# Patient Record
Sex: FEMALE | Race: WHITE | NOT HISPANIC OR LATINO | Employment: OTHER | URBAN - METROPOLITAN AREA
[De-identification: names, ages, dates, MRNs, and addresses within clinical notes are randomized per-mention and may not be internally consistent; named-entity substitution may affect disease eponyms.]

---

## 2017-09-27 ENCOUNTER — HOSPITAL ENCOUNTER (EMERGENCY)
Facility: HOSPITAL | Age: 52
Discharge: HOME | End: 2017-09-27
Attending: EMERGENCY MEDICINE
Payer: MEDICAID

## 2017-09-27 VITALS
OXYGEN SATURATION: 96 % | WEIGHT: 170 LBS | DIASTOLIC BLOOD PRESSURE: 85 MMHG | SYSTOLIC BLOOD PRESSURE: 132 MMHG | HEART RATE: 104 BPM | HEIGHT: 69 IN | TEMPERATURE: 98 F | RESPIRATION RATE: 18 BRPM | BODY MASS INDEX: 25.18 KG/M2

## 2017-09-27 DIAGNOSIS — M25.569 KNEE PAIN: ICD-10-CM

## 2017-09-27 DIAGNOSIS — S83.92XA SPRAIN OF LEFT KNEE, UNSPECIFIED LIGAMENT, INITIAL ENCOUNTER: Primary | ICD-10-CM

## 2017-09-27 PROCEDURE — 99283 EMERGENCY DEPT VISIT LOW MDM: CPT

## 2017-09-27 RX ORDER — NAPROXEN 375 MG/1
375 TABLET ORAL 2 TIMES DAILY WITH MEALS
Qty: 30 TABLET | Refills: 0 | Status: SHIPPED | OUTPATIENT
Start: 2017-09-27

## 2017-09-27 NOTE — ED NOTES
Pt examined by Dr Coppola  without RN, educated on prescriptions, given discharge instructions and discharged to [facility]. See provider notes for exam.

## 2017-09-27 NOTE — ED PROVIDER NOTES
"SCRIBE #1 NOTE: I, Atilio Murphy Magdaleno, am scribing for, and in the presence of, Ruslan Coppola MD. I have scribed the entire note.      History      Chief Complaint   Patient presents with    Knee Pain     L knee pain x 3 weeks after cleaning a tub on her knees       Review of patient's allergies indicates:  No Known Allergies     HPI   HPI    9/27/2017, 3:01 PM   History obtained from the patient      History of Present Illness: Elham Leiva is a 52 y.o. female patient who presents to the Emergency Department for left knee pain which onset gradually 3 weeks ago after pt was cleaning a tub and heard a "pop" in her knee. Sxs are constant and moderate in severity. There are no mitigating or exacerbating factors noted. Associated sxs include left knee swelling.  Pt denies any fever, N/V/D, numbness, tingling, calf pain, ankle pain, trouble walking, and all other sxs at this time. No further complaints or concerns at this time.     Arrival mode: Personal vehicle      PCP: Provider [facility]     Past Medical History:  Past medical history reviewed not relevant      Past Surgical History:  Past surgical history reviewed not relevant      Family History:  Family history reviewed not relevant      Social History:  Social History    Social History Main Topics    Social History Main Topics    Smoking status: Unknown if ever smoked    Smokeless tobacco: Unknown if ever used    Alcohol Use: Unknown drinking history    Drug Use: Unknown if ever used    Sexual Activity: Unknown         ROS   Review of Systems   Constitutional: Negative for fever.   HENT: Negative for sore throat.    Respiratory: Negative for shortness of breath.    Cardiovascular: Negative for chest pain.   Gastrointestinal: Negative for abdominal pain, diarrhea, nausea and vomiting.   Genitourinary: Negative for dysuria.   Musculoskeletal: Positive for arthralgias (left knee) and joint swelling (left knee). Negative for back pain.   Skin: " "Negative for rash.   Neurological: Negative for weakness.   Hematological: Does not bruise/bleed easily.       Physical Exam      Initial Vitals [09/27/17 1453]   BP Pulse Resp Temp SpO2   132/85 104 18 98 °F (36.7 °C) 96 %      MAP       100.67          Physical Exam  Nursing Notes and Vital Signs Reviewed.  Constitutional: Patient is in no acute distress. Well-developed and well-nourished.  Head: Atraumatic. Normocephalic.  Eyes: PERRL. EOM intact. Conjunctivae are not pale. No scleral icterus.  ENT: Mucous membranes are moist. Oropharynx is clear and symmetric.    Neck: Supple. Full ROM. No lymphadenopathy.  Cardiovascular: Regular rate. Regular rhythm. No murmurs, rubs, or gallops. Distal pulses are 2+ and symmetric.  Pulmonary/Chest: No respiratory distress. Clear to auscultation bilaterally. No wheezing, rales, or rhonchi.  Abdominal: Soft and non-distended.  There is no tenderness.  No rebound, guarding, or rigidity. Good bowel sounds.  Genitourinary: No CVA tenderness  Musculoskeletal: Moves all extremities. No obvious deformities. No edema. No calf tenderness.  Left Knee:  No obvious deformity. There is no swelling.  There is no tenderness.  No increased warmth, erythema, induration or fluctuance.  No ligament laxity. DP and PT pulses are 2+.  Normal capillary refill.  Distal sensation is intact.  Skin: Warm and dry.  Neurological:  Alert, awake, and appropriate.  Normal speech.  No acute focal neurological deficits are appreciated.  Psychiatric: Normal affect. Good eye contact. Appropriate in content.    ED Course    Procedures  ED Vital Signs:  Vitals:    09/27/17 1453   BP: 132/85   Pulse: 104   Resp: 18   Temp: 98 °F (36.7 °C)   TempSrc: Oral   SpO2: 96%   Weight: 77.1 kg (170 lb)   Height: 5' 9" (1.753 m)       Imaging Results:  Imaging Results          X-Ray Knee Complete 4 or More Views Left (Final result)  Result time 09/27/17 15:28:46    Final result by Shabbir Roman MD (09/27/17 15:28:46)     "             Impression:      No acute fracture or dislocation. Small joint effusion. Mild degenerative changes      Electronically signed by: ROSELINE MOULTON MD  Date:     09/27/17  Time:    15:28              Narrative:    History:  Pain    Comparison:  None    Results:  4 views of the left knee were obtained.    No evidence of acute fracture or dislocation.  Bony mineralization is normal.  Soft tissues are unremarkable.  Mild tricompartment degenerative changes with mild narrowing and sclerosis. Small joint effusion on the lateral view.                                      The Emergency Provider reviewed the vital signs and test results, which are outlined above.    ED Discussion     3:59 PM: Reassessed pt. Discussed with pt all pertinent ED information and results. Discussed plan of treatment with pt. Gave pt all f/u and return to the ED instructions. All questions and concerns were addressed at this time. Pt understands and agrees to plan as discussed. Pt is stable for discharge.     I discussed with patient and/or family/caretaker that negative X-ray does not rule out occult fracture or other soft tissue injury.  Persistent pain greater than 7-10 days or increased pain requires follow up, specifically with orthopedics.     ED Medication(s):  Medications - No data to display    Discharge Medication List as of 9/27/2017  3:57 PM      START taking these medications    Details   naproxen (NAPROSYN) 375 MG tablet Take 1 tablet (375 mg total) by mouth 2 (two) times daily with meals., Starting Wed 9/27/2017, Print             Follow-up Information     [facility] in 2 days.    Contact information:  [address] [facility] [address] [phone]                     Medical Decision Making    Medical Decision Making:   Clinical Tests:   Radiological Study: Ordered and Reviewed           Scribe Attestation:   Scribe #1: I performed the above scribed service and the documentation accurately describes  the services I performed. I attest to the accuracy of the note.    Attending:   Physician Attestation Statement for Scribe #1: I, Ruslan Coppola MD, personally performed the services described in this documentation, as scribed by Atilio Magdaleno, in my presence, and it is both accurate and complete.          Clinical Impression       ICD-10-CM ICD-9-CM   1. Sprain of left knee, unspecified ligament, initial encounter S83.92XA 844.9   2. Knee pain M25.569 719.46       Disposition:   Disposition: Discharged  Condition: Stable         Ruslan Coppola MD  09/27/17 3011

## 2018-07-25 ENCOUNTER — HOSPITAL ENCOUNTER (OUTPATIENT)
Dept: RADIOLOGY | Facility: HOSPITAL | Age: 53
Discharge: HOME | End: 2018-07-25
Attending: NURSE PRACTITIONER
Payer: MEDICAID

## 2018-07-25 DIAGNOSIS — M25.562 LEFT KNEE PAIN: ICD-10-CM

## 2018-07-25 DIAGNOSIS — M25.562 LEFT KNEE PAIN: Primary | ICD-10-CM

## 2018-07-25 PROCEDURE — 73562 X-RAY EXAM OF KNEE 3: CPT | Mod: TC,PO,LT

## 2018-07-25 PROCEDURE — 73560 X-RAY EXAM OF KNEE 1 OR 2: CPT | Mod: TC,PO,RT

## 2018-07-25 PROCEDURE — 73560 X-RAY EXAM OF KNEE 1 OR 2: CPT | Mod: 26,XS,RT, | Performed by: RADIOLOGY

## 2018-07-25 PROCEDURE — 73562 X-RAY EXAM OF KNEE 3: CPT | Mod: 26,LT,, | Performed by: RADIOLOGY

## 2022-03-13 ENCOUNTER — HOSPITAL ENCOUNTER (EMERGENCY)
Facility: HOSPITAL | Age: 57
Discharge: HOME | End: 2022-03-13
Attending: EMERGENCY MEDICINE
Payer: MEDICAID

## 2022-03-13 VITALS
HEART RATE: 102 BPM | TEMPERATURE: 99 F | DIASTOLIC BLOOD PRESSURE: 88 MMHG | WEIGHT: 180 LBS | RESPIRATION RATE: 18 BRPM | HEIGHT: 69 IN | BODY MASS INDEX: 26.66 KG/M2 | OXYGEN SATURATION: 96 % | SYSTOLIC BLOOD PRESSURE: 147 MMHG

## 2022-03-13 DIAGNOSIS — S82.831A OTHER CLOSED FRACTURE OF DISTAL END OF RIGHT FIBULA, INITIAL ENCOUNTER: Primary | ICD-10-CM

## 2022-03-13 DIAGNOSIS — M25.571 RIGHT ANKLE PAIN: ICD-10-CM

## 2022-03-13 PROCEDURE — 29515 APPLICATION SHORT LEG SPLINT: CPT | Mod: RT

## 2022-03-13 PROCEDURE — 25000003 PHARM REV CODE 250: Performed by: NURSE PRACTITIONER

## 2022-03-13 PROCEDURE — 99283 EMERGENCY DEPT VISIT LOW MDM: CPT | Mod: 25

## 2022-03-13 RX ORDER — HYDROCODONE BITARTRATE AND ACETAMINOPHEN 10; 325 MG/1; MG/1
1 TABLET ORAL EVERY 4 HOURS PRN
Qty: 18 TABLET | Refills: 0 | Status: SHIPPED | OUTPATIENT
Start: 2022-03-13

## 2022-03-13 RX ORDER — HYDROCODONE BITARTRATE AND ACETAMINOPHEN 5; 325 MG/1; MG/1
1 TABLET ORAL
Status: COMPLETED | OUTPATIENT
Start: 2022-03-13 | End: 2022-03-13

## 2022-03-13 RX ORDER — METHADONE HYDROCHLORIDE 5 MG/5ML
110 SOLUTION ORAL DAILY
COMMUNITY

## 2022-03-13 RX ADMIN — HYDROCODONE BITARTRATE AND ACETAMINOPHEN 1 TABLET: 5; 325 TABLET ORAL at 04:03

## 2022-03-13 NOTE — ED PROVIDER NOTES
Encounter Date: 3/13/2022       History     Chief Complaint   Patient presents with    Ankle Pain     States fell off bed and hurt R ankle last night     Patient is a 57-year-old female presents with complaints of right ankle pain.  Patient states he on his of symptoms was last night when she twisted her ankle getting out of bed.  No medications taken for relief of symptoms.  No distress noted at this time.        Review of patient's allergies indicates:  No Known Allergies  No past medical history on file.  No past surgical history on file.  No family history on file.     Review of Systems   Constitutional: Negative for fever.   HENT: Negative for sore throat.    Respiratory: Negative for shortness of breath.    Cardiovascular: Negative for chest pain.   Gastrointestinal: Negative for nausea.   Genitourinary: Negative for dysuria.   Musculoskeletal: Positive for arthralgias. Negative for back pain.   Skin: Negative for rash.   Neurological: Negative for weakness.   Hematological: Does not bruise/bleed easily.       Physical Exam     Initial Vitals [03/13/22 1519]   BP Pulse Resp Temp SpO2   (!) 151/93 (!) 111 19 98.3 °F (36.8 °C) 95 %      MAP       --         Physical Exam    Nursing note and vitals reviewed.  Constitutional: She appears well-developed and well-nourished.   HENT:   Head: Normocephalic and atraumatic.   Eyes: EOM are normal. Pupils are equal, round, and reactive to light.   Neck: Neck supple.   Normal range of motion.  Cardiovascular: Normal rate, regular rhythm, normal heart sounds and intact distal pulses.   Pulmonary/Chest: Breath sounds normal.   Abdominal: Abdomen is soft. Bowel sounds are normal.   Musculoskeletal:      Cervical back: Normal range of motion and neck supple.      Comments: Decreased range of motion of the right ankle, worse with inversion     Neurological: She is alert and oriented to person, place, and time. She has normal strength and normal reflexes.   Skin: Skin is warm  "and dry.   Ecchymosis and swelling noted to the right ankle         ED Course   Procedures  Labs Reviewed - No data to display       Imaging Results          X-Ray Ankle Complete Right (Final result)  Result time 03/13/22 15:48:34    Final result by Shabbir Lim MD (03/13/22 15:48:34)                 Impression:      Displaced lateral malleolar fracture with widening of the ankle mortise joint.  Extensive soft tissue swelling.      Electronically signed by: Shabbir Lim MD  Date:    03/13/2022  Time:    15:48             Narrative:    EXAMINATION:  XR ANKLE COMPLETE 3 VIEW RIGHT    CLINICAL HISTORY:  Pain in right ankle and joints of right foot    TECHNIQUE:  AP, lateral, and oblique images of the right ankle were performed.    COMPARISON:  None    FINDINGS:  Soft tissue swelling.  Fracture through the lateral malleolus.  Widening of the medial ankle joint.                                 Medications   HYDROcodone-acetaminophen 5-325 mg per tablet 1 tablet (1 tablet Oral Given 3/13/22 1630)     Medical Decision Making:   ED Management:  Patient informed of imaging results.  Patient instructed to follow-up with orthopedist this week for further evaluation and management of fibular fracture.  Patient verbalized understanding agreed to plan.  Other:   I have discussed this case with another health care provider.       <> Summary of the Discussion: Consult made with Dr. uLque who recommends, "please place her in a well padded posterior splint with stirrups. She will need to follow up in ortho trauma clinic this week".                      Clinical Impression:   Final diagnoses:  [M25.571] Right ankle pain  [S82.831A] Other closed fracture of distal end of right fibula, initial encounter (Primary)          ED Disposition Condition    Discharge Stable        ED Prescriptions     Medication Sig Dispense Start Date End Date Auth. Provider    HYDROcodone-acetaminophen (NORCO)  mg per tablet Take 1 tablet by mouth every " 4 (four) hours as needed for Pain. 18 tablet 3/13/2022  Macho Lincoln NP        Follow-up Information     Follow up With Specialties Details Why Contact Info    [facility]  Schedule an appointment as soon as possible for a visit   [id number] [place] [address] [address]  [facility] [address]  [phone]             Macho Lincoln NP  03/13/22 6686

## 2022-03-15 ENCOUNTER — TELEPHONE (OUTPATIENT)
Dept: ORTHOPEDICS | Facility: CLINIC | Age: 57
End: 2022-03-15
Payer: MEDICAID

## 2022-03-15 NOTE — TELEPHONE ENCOUNTER
Spoke with patient and scheduled her ER follow up appointment. Patient verbalized understanding of appointment date, time and location.

## 2022-03-15 NOTE — TELEPHONE ENCOUNTER
----- Message from Salome Cunningham MA sent at 3/15/2022  3:42 PM CDT -----  Contact: ZARINA BAGLEY [[id number]] [phone]    ----- Message -----  From: Nela Palmer  Sent: 3/15/2022   3:28 PM CDT  To: [facility] Clinical Staff    Type: Appointment Request    Name of Caller: ZARINA BAGLEY [[id number]]  When is the first available appointment? Do not have access  Reason for Visit:  ED F/U from 3/13/22, fractured fibula  Best Call Back Number: [phone]  Additional Information:

## 2022-03-16 ENCOUNTER — OFFICE VISIT (OUTPATIENT)
Dept: ORTHOPEDICS | Facility: CLINIC | Age: 57
End: 2022-03-16
Payer: MEDICAID

## 2022-03-16 VITALS
HEIGHT: 69 IN | BODY MASS INDEX: 26.64 KG/M2 | DIASTOLIC BLOOD PRESSURE: 84 MMHG | WEIGHT: 179.88 LBS | SYSTOLIC BLOOD PRESSURE: 141 MMHG | HEART RATE: 103 BPM

## 2022-03-16 DIAGNOSIS — M25.571 RIGHT ANKLE PAIN, UNSPECIFIED CHRONICITY: Primary | ICD-10-CM

## 2022-03-16 DIAGNOSIS — S82.891A CLOSED FRACTURE OF RIGHT ANKLE, INITIAL ENCOUNTER: Primary | ICD-10-CM

## 2022-03-16 PROCEDURE — 99204 OFFICE O/P NEW MOD 45 MIN: CPT | Mod: S$PBB,25,, | Performed by: ORTHOPAEDIC SURGERY

## 2022-03-16 PROCEDURE — 99215 OFFICE O/P EST HI 40 MIN: CPT | Mod: PBBFAC | Performed by: ORTHOPAEDIC SURGERY

## 2022-03-16 PROCEDURE — 99204 PR OFFICE/OUTPT VISIT, NEW, LEVL IV, 45-59 MIN: ICD-10-PCS | Mod: S$PBB,25,, | Performed by: ORTHOPAEDIC SURGERY

## 2022-03-16 PROCEDURE — 99999 PR PBB SHADOW E&M-EST. PATIENT-LVL V: CPT | Mod: PBBFAC,,, | Performed by: ORTHOPAEDIC SURGERY

## 2022-03-16 PROCEDURE — 3008F PR BODY MASS INDEX (BMI) DOCUMENTED: ICD-10-PCS | Mod: CPTII,,, | Performed by: ORTHOPAEDIC SURGERY

## 2022-03-16 PROCEDURE — 3077F PR MOST RECENT SYSTOLIC BLOOD PRESSURE >= 140 MM HG: ICD-10-PCS | Mod: CPTII,,, | Performed by: ORTHOPAEDIC SURGERY

## 2022-03-16 PROCEDURE — 3077F SYST BP >= 140 MM HG: CPT | Mod: CPTII,,, | Performed by: ORTHOPAEDIC SURGERY

## 2022-03-16 PROCEDURE — 29515 APPLICATION SHORT LEG SPLINT: CPT | Mod: PBBFAC | Performed by: ORTHOPAEDIC SURGERY

## 2022-03-16 PROCEDURE — 29515 APPLICATION SHORT LEG SPLINT: CPT | Mod: S$PBB,RT,, | Performed by: ORTHOPAEDIC SURGERY

## 2022-03-16 PROCEDURE — 29515 PR APPLY LOWER LEG SPLINT: ICD-10-PCS | Mod: S$PBB,RT,, | Performed by: ORTHOPAEDIC SURGERY

## 2022-03-16 PROCEDURE — 3008F BODY MASS INDEX DOCD: CPT | Mod: CPTII,,, | Performed by: ORTHOPAEDIC SURGERY

## 2022-03-16 PROCEDURE — 99999 PR PBB SHADOW E&M-EST. PATIENT-LVL V: ICD-10-PCS | Mod: PBBFAC,,, | Performed by: ORTHOPAEDIC SURGERY

## 2022-03-16 PROCEDURE — 3079F DIAST BP 80-89 MM HG: CPT | Mod: CPTII,,, | Performed by: ORTHOPAEDIC SURGERY

## 2022-03-16 PROCEDURE — 1159F PR MEDICATION LIST DOCUMENTED IN MEDICAL RECORD: ICD-10-PCS | Mod: CPTII,,, | Performed by: ORTHOPAEDIC SURGERY

## 2022-03-16 PROCEDURE — 3079F PR MOST RECENT DIASTOLIC BLOOD PRESSURE 80-89 MM HG: ICD-10-PCS | Mod: CPTII,,, | Performed by: ORTHOPAEDIC SURGERY

## 2022-03-16 PROCEDURE — 1159F MED LIST DOCD IN RCRD: CPT | Mod: CPTII,,, | Performed by: ORTHOPAEDIC SURGERY

## 2022-03-16 PROCEDURE — 1160F PR REVIEW ALL MEDS BY PRESCRIBER/CLIN PHARMACIST DOCUMENTED: ICD-10-PCS | Mod: CPTII,,, | Performed by: ORTHOPAEDIC SURGERY

## 2022-03-16 PROCEDURE — 1160F RVW MEDS BY RX/DR IN RCRD: CPT | Mod: CPTII,,, | Performed by: ORTHOPAEDIC SURGERY

## 2022-03-17 NOTE — PROGRESS NOTES
"Subjective:      Patient ID: Elham Leiva is a 57 y.o. female.    Chief Complaint: Pain of the Right Ankle      HPI: Elham Leiva is a 57-year-old female in clinic today for evaluation of right ankle pain.  Patient states that she was sitting on the edge of her bed tying her shoe when she fell over.  This injury occurred 4 days ago.  Patient was evaluated in the emergency department where radiographs were obtained and a displaced lateral malleolus fracture with widening of the ankle mortise joint was identified.  Patient was placed in a splint and referred to see us as an outpatient.  Patient has a history of cerebral aneurysm which has not been followed in at least 2 years per the patient.    History reviewed. No pertinent past medical history.    Current Outpatient Medications:     aspirin-acetaminophen-caffeine 500-325-65 mg PwPk, Take 1 Dose by mouth as needed., Disp: , Rfl:     HYDROcodone-acetaminophen (NORCO)  mg per tablet, Take 1 tablet by mouth every 4 (four) hours as needed for Pain., Disp: 18 tablet, Rfl: 0    methadone (DOLOPHINE) 5 mg/5 mL solution, Take 110 mg by mouth., Disp: , Rfl:     naproxen (NAPROSYN) 375 MG tablet, Take 1 tablet (375 mg total) by mouth 2 (two) times daily with meals. (Patient not taking: Reported on 3/16/2022), Disp: 30 tablet, Rfl: 0  Review of patient's allergies indicates:   Allergen Reactions    Cortisone        BP (!) 141/84 (BP Location: Right arm, Patient Position: Sitting, BP Method: Large (Automatic))   Pulse 103   Ht 5' 9" (1.753 m)   Wt 81.6 kg (179 lb 14.3 oz)   BMI 26.57 kg/m²     ROS      Objective:    Ortho Exam   Right ankle:  Splint removed for exam  Moderate to severe edema, especially anteriorly and over the lateral malleolus  Patient is exquisitely tender over the lateral malleolus  Ankle ROM decreased secondary to swelling and pain  Calf and compartments are soft and compressible  Motor exam normal  Sensation and pulses intact    GEN: " Well developed, well nourished female. AAOX3. No acute distress.   Normocephalic, atraumatic.   [place]  Breathing unlabored.  Mood and affect appropriate.        Assessment:     Imaging:  No new imaging obtained today.  Right ankle radiographs obtained in the emergency department 3 days ago were reviewed showing displaced lateral malleolar fracture with widening of the ankle mortise joint        1. Closed fracture of right ankle, initial encounter          Plan:       Discussed displacement of fibular fracture, unstable nature  Recommend ORIF  Currently has significant swelling  Will make NWB  Applied short leg plaster splint, well padded  Elevation  Follow up Wednesday on Dianne Gusman's schedule for swelling check, tentative plan for surgery that Friday - will need consents and further surgical discussion    I discussed worrisome and red flag signs and symptoms with the patient. The patient expressed understanding and agreed to alert me immediately or to go to the emergency room if they experience any of these.       Orders Placed This Encounter    Case Request Operating Room: Right ankle open reduction internal fixation, possible syndesmosis fixation, any indicated procedures     Follow up in about 1 week (around 3/23/2022).          Patient note was created using MModal Dictation.  Any errors in syntax or even information may not have been identified and edited on initial review prior to signing this note.

## 2022-03-22 ENCOUNTER — HOSPITAL ENCOUNTER (OUTPATIENT)
Dept: PREADMISSION TESTING | Facility: HOSPITAL | Age: 57
Discharge: HOME | End: 2022-03-22
Attending: ORTHOPAEDIC SURGERY
Payer: MEDICAID

## 2022-03-22 ENCOUNTER — LAB VISIT (OUTPATIENT)
Dept: PRIMARY CARE CLINIC | Facility: CLINIC | Age: 57
End: 2022-03-22
Payer: MEDICAID

## 2022-03-22 VITALS
DIASTOLIC BLOOD PRESSURE: 80 MMHG | OXYGEN SATURATION: 95 % | TEMPERATURE: 98 F | RESPIRATION RATE: 20 BRPM | BODY MASS INDEX: 26.51 KG/M2 | WEIGHT: 179 LBS | SYSTOLIC BLOOD PRESSURE: 130 MMHG | HEIGHT: 69 IN | HEART RATE: 78 BPM

## 2022-03-22 DIAGNOSIS — Z01.818 PRE-OP TESTING: ICD-10-CM

## 2022-03-22 DIAGNOSIS — F11.10 OPIOID ABUSE: ICD-10-CM

## 2022-03-22 DIAGNOSIS — Z01.818 PREOP EXAMINATION: Primary | ICD-10-CM

## 2022-03-22 DIAGNOSIS — I67.1 BRAIN ANEURYSM: ICD-10-CM

## 2022-03-22 DIAGNOSIS — Z72.0 TOBACCO ABUSE: ICD-10-CM

## 2022-03-22 DIAGNOSIS — K21.9 GASTROESOPHAGEAL REFLUX DISEASE, UNSPECIFIED WHETHER ESOPHAGITIS PRESENT: ICD-10-CM

## 2022-03-22 PROBLEM — S82.891A CLOSED FRACTURE OF RIGHT ANKLE: Status: ACTIVE | Noted: 2022-03-22

## 2022-03-22 PROBLEM — I10 HTN (HYPERTENSION): Status: ACTIVE | Noted: 2022-03-22

## 2022-03-22 LAB
ALBUMIN SERPL BCP-MCNC: 3.7 G/DL (ref 3.5–5.2)
ALP SERPL-CCNC: 82 U/L (ref 55–135)
ALT SERPL W/O P-5'-P-CCNC: 22 U/L (ref 10–44)
ANION GAP SERPL CALC-SCNC: 10 MMOL/L (ref 8–16)
AST SERPL-CCNC: 20 U/L (ref 10–40)
BASOPHILS # BLD AUTO: 0.06 K/UL (ref 0–0.2)
BASOPHILS NFR BLD: 0.8 % (ref 0–1.9)
BILIRUB SERPL-MCNC: 0.3 MG/DL (ref 0.1–1)
BUN SERPL-MCNC: 17 MG/DL (ref 6–20)
CALCIUM SERPL-MCNC: 9.3 MG/DL (ref 8.7–10.5)
CHLORIDE SERPL-SCNC: 102 MMOL/L (ref 95–110)
CO2 SERPL-SCNC: 28 MMOL/L (ref 23–29)
CREAT SERPL-MCNC: 0.8 MG/DL (ref 0.5–1.4)
DIFFERENTIAL METHOD: NORMAL
EOSINOPHIL # BLD AUTO: 0.2 K/UL (ref 0–0.5)
EOSINOPHIL NFR BLD: 2.6 % (ref 0–8)
ERYTHROCYTE [DISTWIDTH] IN BLOOD BY AUTOMATED COUNT: 13.3 % (ref 11.5–14.5)
EST. GFR  (AFRICAN AMERICAN): >60 ML/MIN/1.73 M^2
EST. GFR  (NON AFRICAN AMERICAN): >60 ML/MIN/1.73 M^2
GLUCOSE SERPL-MCNC: 92 MG/DL (ref 70–110)
HCT VFR BLD AUTO: 39.9 % (ref 37–48.5)
HGB BLD-MCNC: 12.8 G/DL (ref 12–16)
IMM GRANULOCYTES # BLD AUTO: 0.04 K/UL (ref 0–0.04)
IMM GRANULOCYTES NFR BLD AUTO: 0.5 % (ref 0–0.5)
LYMPHOCYTES # BLD AUTO: 2.6 K/UL (ref 1–4.8)
LYMPHOCYTES NFR BLD: 35.9 % (ref 18–48)
MCH RBC QN AUTO: 27.9 PG (ref 27–31)
MCHC RBC AUTO-ENTMCNC: 32.1 G/DL (ref 32–36)
MCV RBC AUTO: 87 FL (ref 82–98)
MONOCYTES # BLD AUTO: 0.6 K/UL (ref 0.3–1)
MONOCYTES NFR BLD: 8 % (ref 4–15)
NEUTROPHILS # BLD AUTO: 3.8 K/UL (ref 1.8–7.7)
NEUTROPHILS NFR BLD: 52.2 % (ref 38–73)
NRBC BLD-RTO: 0 /100 WBC
PLATELET # BLD AUTO: 278 K/UL (ref 150–450)
PMV BLD AUTO: 10.9 FL (ref 9.2–12.9)
POTASSIUM SERPL-SCNC: 4.4 MMOL/L (ref 3.5–5.1)
PROT SERPL-MCNC: 7.4 G/DL (ref 6–8.4)
RBC # BLD AUTO: 4.58 M/UL (ref 4–5.4)
SODIUM SERPL-SCNC: 140 MMOL/L (ref 136–145)
WBC # BLD AUTO: 7.36 K/UL (ref 3.9–12.7)

## 2022-03-22 PROCEDURE — 80053 COMPREHEN METABOLIC PANEL: CPT | Performed by: NURSE PRACTITIONER

## 2022-03-22 PROCEDURE — 93010 ELECTROCARDIOGRAM REPORT: CPT | Mod: ,,, | Performed by: INTERNAL MEDICINE

## 2022-03-22 PROCEDURE — U0005 INFEC AGEN DETEC AMPLI PROBE: HCPCS | Performed by: NURSE PRACTITIONER

## 2022-03-22 PROCEDURE — 85025 COMPLETE CBC W/AUTO DIFF WBC: CPT | Performed by: NURSE PRACTITIONER

## 2022-03-22 PROCEDURE — U0003 INFECTIOUS AGENT DETECTION BY NUCLEIC ACID (DNA OR RNA); SEVERE ACUTE RESPIRATORY SYNDROME CORONAVIRUS 2 (SARS-COV-2) (CORONAVIRUS DISEASE [COVID-19]), AMPLIFIED PROBE TECHNIQUE, MAKING USE OF HIGH THROUGHPUT TECHNOLOGIES AS DESCRIBED BY CMS-2020-01-R: HCPCS | Performed by: NURSE PRACTITIONER

## 2022-03-22 PROCEDURE — 93010 EKG 12-LEAD: ICD-10-PCS | Mod: ,,, | Performed by: INTERNAL MEDICINE

## 2022-03-22 PROCEDURE — 93005 ELECTROCARDIOGRAM TRACING: CPT

## 2022-03-22 RX ORDER — METOPROLOL TARTRATE 25 MG/1
25 TABLET, FILM COATED ORAL DAILY
COMMUNITY

## 2022-03-22 NOTE — ASSESSMENT & PLAN NOTE
- Patient presents today at the request of Dr. Carlin who plans on performing a right ankle ORIF on 3/25, s/p fall at home.    Known risk factors for perioperative complications: None    H/O opioid abuse, currently on Methadone.  Tobacco abuse    Difficulty with intubation is not anticipated.    Cardiac Risk Estimation: Based on the Revised Cardiac Risk Index, patient is a Class 1 risk with a 3.9% risk of a major cardiac event.    1.) Preoperative workup as follows: ECG, hemoglobin, hematocrit, electrolytes, creatinine, glucose, liver function studies.  2.) Change in medication regimen before surgery: discontinue ASA 6 days before surgery, discontinue NSAIDs 5 days before surgery.  3.) Prophylaxis for cardiac events with perioperative beta-blockers: Continue home Metoprolol.  4.) Invasive hemodynamic monitoring perioperatively: not indicated.  5.) Deep vein thrombosis prophylaxis postoperatively: intermittent pneumatic compression boots and regimen to be chosen by surgical team.  6.) Surveillance for postoperative MI with ECG immediately postoperatively and on postoperati ve days 1 and 2 AND troponin levels 24 hours postoperatively and on day 4 or hospital discharge (whichever comes first): not indicated.  7.) Current medications which may produce withdrawal symptoms if withheld perioperatively: None.  8.) Other measures: Patient noted to have limited ROM to neck.  Evaluated at  by angel Escamilla to proceed with surgery at [facility].

## 2022-03-22 NOTE — H&P
Preoperative History and Physical  [facility]                                                                   Chief Complaint: Preoperative evaluation     History of Present Illness:      Elham Leiva is a 57 y.o. female with a PMHx of opioid abuse, now on Methadone daily, Arthritis, Brain aneurysm s/p coiling over 10 years ago, GERD/PUD, HTN, Tobacco abuse, and ankle fracture who presents to the office today for a preoperative consultation at the request of Dr. Carlin who plans on performing Right ankle ORIF on March 25.     Functional Status:      The patient is not able to climb a flight of stairs due to ankle fracture, but reports before fracture she could climb 2 flights without difficulty. The patient is not able to ambulate due to ankle fracture. The patient's functional status is affected by the surgical problem. The patient's functional status is not affected by shortness of breath, chest pain, dyspnea on exertion and fatigue.    MET score greater than 4    Past Medical History:      Past Medical History:   Diagnosis Date    Arthritis     Brain aneurysm     Digestive disorder     with h/o bleeding ulcers.    Encounter for blood transfusion     Hypertension     Lung abscess     Opioid abuse     PUD (peptic ulcer disease)     Tobacco abuse         Past Surgical History:      Past Surgical History:   Procedure Laterality Date    ABDOMINAL SURGERY      APPENDECTOMY      BRAIN SURGERY      s/p Coiling >10 years ago.    Colonosocpy      DILATION AND CURETTAGE OF UTERUS      PARTIAL GASTRECTOMY      UPPER GASTROINTESTINAL ENDOSCOPY          Social History:      Social History     Socioeconomic History    Marital status:    Tobacco Use    Smoking status: Current Every Day Smoker     Packs/day: 0.25    Smokeless tobacco: Never Used   Substance and Sexual Activity    Alcohol use: Not Currently     Comment: Stopped alcohol use 15  years ago.    Drug use: Yes     Types: Marijuana, Cocaine     Comment: Last used in her 30s.        Family History:      Family History   Problem Relation Age of Onset    Uterine cancer Mother     Heart disease Mother     Dementia Mother     Heart disease Father     Prostate cancer Father     Diabetes Brother        Allergies:      Review of patient's allergies indicates:   Allergen Reactions    Cortisone        Medications:      Current Outpatient Medications   Medication Sig    methadone (DOLOPHINE) 5 mg/5 mL solution Take 110 mg by mouth once daily.    metoprolol tartrate (LOPRESSOR) 25 MG tablet Take 25 mg by mouth once daily.    aspirin-acetaminophen-caffeine 500-325-65 mg PwPk Take 1 Dose by mouth as needed.    HYDROcodone-acetaminophen (NORCO)  mg per tablet Take 1 tablet by mouth every 4 (four) hours as needed for Pain.    naproxen (NAPROSYN) 375 MG tablet Take 1 tablet (375 mg total) by mouth 2 (two) times daily with meals.     No current facility-administered medications for this encounter.       Vitals:      Vitals:    03/22/22 1156   BP: 130/80   Pulse: 78   Resp: 20   Temp: 97.8 °F (36.6 °C)       Review of Systems:        Constitutional: Negative for fever, chills, weight loss, malaise/fatigue and diaphoresis.   HENT: Negative for hearing loss, ear pain, nosebleeds, congestion, sore throat, neck pain, tinnitus and ear discharge.    Eyes: Negative for blurred vision, double vision, photophobia, pain, discharge and redness.   Respiratory: Negative for cough, hemoptysis, sputum production, shortness of breath, wheezing and stridor.    Cardiovascular: Negative for chest pain, palpitations, orthopnea, claudication, leg swelling and PND.   Gastrointestinal: Negative for heartburn, nausea, vomiting, abdominal pain, diarrhea, constipation, blood in stool and melena.   Genitourinary: Negative for dysuria, urgency, frequency, hematuria and flank pain.   Musculoskeletal: Negative for myalgias,  back pain, and falls. Positive for right foot/ankle pain, rates 10/10.  Skin: Negative for itching and rash.   Neurological: Negative for dizziness, tingling, tremors, sensory change, speech change, focal weakness, seizures, loss of consciousness, weakness and headaches.   Endo/Heme/Allergies: Negative for environmental allergies and polydipsia. Does not bruise/bleed easily.   Psychiatric/Behavioral: Negative for depression, suicidal ideas, hallucinations, memory loss and substance abuse. The patient is not nervous/anxious and does not have insomnia.    All 14 systems reviewed and negative except as noted above.    Physical Exam:      Constitutional: Appears well-developed, well-nourished and in no acute distress. Unkept, Appears much older than stated age. Patient is oriented to person, place, and time.   Head: Normocephalic and atraumatic. Mucous membranes moist.  Neck: Neck supple no mass.   Cardiovascular: Normal rate and regular rhythm.  S1 S2 appreciated by ascultation.  Pulmonary/Chest: Effort normal and clear to auscultation bilaterally. No respiratory distress.   Abdomen: Soft. Non-tender and non-distended. Bowel sounds are normal.   Neurological: Patient is alert and oriented to person, place and time. Moves all extremities.  Skin: Warm and dry. No lesions.  Extremities: No clubbing, cyanosis.  Cast intact to Right foot.    Laboratory data:      Reviewed and noted in plan where applicable. Please see chart for full laboratory data.    No results for input(s): CPK, CPKMB, TROPONINI, MB in the last 24 hours. No results for input(s): POCTGLUCOSE in the last 24 hours.     No results found for: INR, PROTIME    No results found for: WBC, HGB, HCT, MCV, PLT    No results for input(s): GLU, NA, K, CL, CO2, BUN, CREATININE, CALCIUM, MG in the last 24 hours.    Predictors of intubation difficulty:       Morbid obesity? yes    Anatomically abnormal facies? no   Prominent incisors? no   Receding mandible? no   Short,  thick neck? yes    Neck range of motion: abnormal- unable to extend.    Dentition: Poor dentition with multiple dental caries.  Based on the Modified Mallampati, patient is a mallampati score: I (soft palate, uvula, fauces, and tonsillar pillars visible)    Cardiographics:      ECG: NSR, cannot rule out anterior infarct, age undetermined.  No previous EKG available.  Awaiting official cardiology review.    Echocardiogram: Not indicated.     Imaging:      Chest x-ray:  Not indicated.    Assessment and Plan:      Closed fracture of right ankle  - Patient presents today at the request of Dr. Carlin who plans on performing a right ankle ORIF on 3/25, s/p fall at home.    Known risk factors for perioperative complications: None    H/O opioid abuse, currently on Methadone.  Tobacco abuse    Difficulty with intubation is not anticipated.    Cardiac Risk Estimation: Based on the Revised Cardiac Risk Index, patient is a Class 1 risk with a 3.9% risk of a major cardiac event.    1.) Preoperative workup as follows: ECG, hemoglobin, hematocrit, electrolytes, creatinine, glucose, liver function studies.  2.) Change in medication regimen before surgery: discontinue ASA 6 days before surgery, discontinue NSAIDs 5 days before surgery.  3.) Prophylaxis for cardiac events with perioperative beta-blockers: Continue home Metoprolol.  4.) Invasive hemodynamic monitoring perioperatively: not indicated.  5.) Deep vein thrombosis prophylaxis postoperatively: intermittent pneumatic compression boots and regimen to be chosen by surgical team.  6.) Surveillance for postoperative MI with ECG immediately postoperatively and on postoperati ve days 1 and 2 AND troponin levels 24 hours postoperatively and on day 4 or hospital discharge (whichever comes first): not indicated.  7.) Current medications which may produce withdrawal symptoms if withheld perioperatively: None.  8.) Other measures: Patient noted to have limited ROM to neck.  Evaluated  "at [facility] by angel Escamilla to proceed with surgery at [facility].    Brain aneurysm  - Per patient report she has a h/o brain aneurysm, s/p coiling approximately 10 years ago. No further information available for review.   - CT head in 2018 showed;    1. No evidence of an acute intracranial hemorrhage.    2. Previous endovascular coiling of left parasellar aneurysm.    3. Stable appearance of brain compared to study dated 10/22/2014.    - Patient reports she has not followed up with Neurology in approximately 2 years since "her doctor moved".  - Encouraged patient to re-establish care as needed.      Opioid abuse  - Currently followed by the Methadone clinic.  - Continue Methadone as prescribed.    HTN (hypertension)  - BP well controlled.  - Continue home Metoprolol.    GERD (gastroesophageal reflux disease)  - with h/o PUD s/p partial gastrectomy/stent placement.  - Not currently on any PPI.  - Asymptomatic.  - Outpatient f/u with GI as directed.          Electronically signed by Kathy Godfrey DNP, RICHAP on 3/22/2022 at 12:08 PM.  "

## 2022-03-22 NOTE — ASSESSMENT & PLAN NOTE
"- Per patient report she has a h/o brain aneurysm, s/p coiling approximately 10 years ago. No further information available for review.   - CT head in 2018 showed;    1. No evidence of an acute intracranial hemorrhage.    2. Previous endovascular coiling of left parasellar aneurysm.    3. Stable appearance of brain compared to study dated 10/22/2014.    - Patient reports she has not followed up with Neurology in approximately 2 years since "her doctor moved".  - Encouraged patient to re-establish care as needed.    "

## 2022-03-22 NOTE — ASSESSMENT & PLAN NOTE
- with h/o PUD s/p partial gastrectomy/stent placement.  - Not currently on any PPI.  - Asymptomatic.  - Outpatient f/u with GI as directed.

## 2022-03-22 NOTE — DISCHARGE INSTRUCTIONS
To confirm, Your doctor has instructed you that surgery is scheduled for 3/25/2022.       Please report to [facility].      Pre admit office will call afternoon prior to surgery with final arrival time.    INSTRUCTIONS IMPORTANT!!!     Do not eat, drink, or smoke after 12 midnight-including water. OK to brush teeth, no gum, candy or mints!    ¨ Take only these medicines with a small swallow of water-morning of surgery.    Lopressor    Pre operative instructions:  Please review the Pre-Operative Instruction booklet that you were given.        Bathing Instructions--See page 6 in the Pre-operative booklet.      Prevention of surgical site infections:     -Keep incisions clean and dry.   -Do not soak/submerge incisions in water until completely healed.   -Do not apply lotions, powders, creams, or deodorants to site.   -Always make sure hands are cleaned with antibacterial soap/ alcohol-based  prior to touching the surgical site.  (This includes doctors, nurses, staff, and yourself.)    Signs and symptoms:   -Redness and pain around the area where you had surgery   -Drainage of cloudy fluid from your surgical wound   -Fever over 100.4       I have read or had read and explained to me, and understand the above information.  Additional comments or instructions:  Received a copy of Pre-operative instructions booklet, FAQ surgical site infection sheet, and packets of hibiclens (if indicated).

## 2022-03-23 ENCOUNTER — PATIENT MESSAGE (OUTPATIENT)
Dept: ADMINISTRATIVE | Facility: OTHER | Age: 57
End: 2022-03-23
Payer: MEDICAID

## 2022-03-23 ENCOUNTER — LAB VISIT (OUTPATIENT)
Dept: LAB | Facility: HOSPITAL | Age: 57
End: 2022-03-23
Attending: ORTHOPAEDIC SURGERY
Payer: MEDICAID

## 2022-03-23 ENCOUNTER — OFFICE VISIT (OUTPATIENT)
Dept: ORTHOPEDICS | Facility: CLINIC | Age: 57
End: 2022-03-23
Payer: MEDICAID

## 2022-03-23 DIAGNOSIS — S82.891D CLOSED FRACTURE OF RIGHT ANKLE WITH ROUTINE HEALING, SUBSEQUENT ENCOUNTER: ICD-10-CM

## 2022-03-23 DIAGNOSIS — S82.891D CLOSED FRACTURE OF RIGHT ANKLE WITH ROUTINE HEALING, SUBSEQUENT ENCOUNTER: Primary | ICD-10-CM

## 2022-03-23 LAB
APTT BLDCRRT: 25.1 SEC (ref 21–32)
INR PPP: 0.9 (ref 0.8–1.2)
PROTHROMBIN TIME: 9.9 SEC (ref 9–12.5)
SARS-COV-2 RNA RESP QL NAA+PROBE: NOT DETECTED
SARS-COV-2- CYCLE NUMBER: NORMAL

## 2022-03-23 PROCEDURE — 36415 COLL VENOUS BLD VENIPUNCTURE: CPT | Performed by: ORTHOPAEDIC SURGERY

## 2022-03-23 PROCEDURE — 1159F MED LIST DOCD IN RCRD: CPT | Mod: CPTII,,, | Performed by: ORTHOPAEDIC SURGERY

## 2022-03-23 PROCEDURE — 99999 PR PBB SHADOW E&M-EST. PATIENT-LVL III: ICD-10-PCS | Mod: PBBFAC,,, | Performed by: ORTHOPAEDIC SURGERY

## 2022-03-23 PROCEDURE — 99214 OFFICE O/P EST MOD 30 MIN: CPT | Mod: S$PBB,25,, | Performed by: ORTHOPAEDIC SURGERY

## 2022-03-23 PROCEDURE — 1159F PR MEDICATION LIST DOCUMENTED IN MEDICAL RECORD: ICD-10-PCS | Mod: CPTII,,, | Performed by: ORTHOPAEDIC SURGERY

## 2022-03-23 PROCEDURE — 29515 APPLICATION SHORT LEG SPLINT: CPT | Mod: PBBFAC | Performed by: ORTHOPAEDIC SURGERY

## 2022-03-23 PROCEDURE — 99999 PR PBB SHADOW E&M-EST. PATIENT-LVL III: CPT | Mod: PBBFAC,,, | Performed by: ORTHOPAEDIC SURGERY

## 2022-03-23 PROCEDURE — 29515 APPLICATION SHORT LEG SPLINT: CPT | Mod: S$PBB,RT,, | Performed by: ORTHOPAEDIC SURGERY

## 2022-03-23 PROCEDURE — 99213 OFFICE O/P EST LOW 20 MIN: CPT | Mod: PBBFAC | Performed by: ORTHOPAEDIC SURGERY

## 2022-03-23 PROCEDURE — 85610 PROTHROMBIN TIME: CPT | Performed by: ORTHOPAEDIC SURGERY

## 2022-03-23 PROCEDURE — 29515 PR APPLY LOWER LEG SPLINT: ICD-10-PCS | Mod: S$PBB,RT,, | Performed by: ORTHOPAEDIC SURGERY

## 2022-03-23 PROCEDURE — 85730 THROMBOPLASTIN TIME PARTIAL: CPT | Performed by: ORTHOPAEDIC SURGERY

## 2022-03-23 PROCEDURE — 99214 PR OFFICE/OUTPT VISIT, EST, LEVL IV, 30-39 MIN: ICD-10-PCS | Mod: S$PBB,25,, | Performed by: ORTHOPAEDIC SURGERY

## 2022-03-23 NOTE — H&P (VIEW-ONLY)
Patient ID: Elham Leiva  YOB: 1965  MRN: [id number]    Chief Complaint: Injury of the Right Ankle    Referred By: ED (on-call injury)    History of Present Illness: Elham Leiva is a 57 y.o. female   unemployed disabled with a chief complaint of Injury of the Right Ankle    Elham Leiva is a 57-year-old female in clinic today for evaluation of right ankle pain.  Patient states that she was sitting on the edge of her bed tying her shoe when she fell over.  This injury occurred on 3/12/2022.  Patient was evaluated in the emergency department where radiographs were obtained and a displaced lateral malleolus fracture with widening of the ankle mortise joint was identified.  Patient was placed in a splint evaluated in our orthopedic trauma clinic on 3/16/22.  She was placed in a plaster splint and has been NWB.  She was instructed to return to office today for a swelling check and to discuss surgery for her ankle.    Patient has a history of cerebral aneurysm.  She is also being treated for opiate addiction with methadone.  She had some trouble getting her prescription for this but has been able to work it out.    Past Medical History:   Past Medical History:   Diagnosis Date    Arthritis     Brain aneurysm     Digestive disorder     with h/o bleeding ulcers.    Encounter for blood transfusion     Hypertension     Lung abscess     Opioid abuse     PUD (peptic ulcer disease)     Tobacco abuse      Past Surgical History:   Procedure Laterality Date    ABDOMINAL SURGERY      APPENDECTOMY      BRAIN SURGERY      s/p Coiling >10 years ago.    Colonosocpy      DILATION AND CURETTAGE OF UTERUS      PARTIAL GASTRECTOMY      UPPER GASTROINTESTINAL ENDOSCOPY       Family History   Problem Relation Age of Onset    Uterine cancer Mother     Heart disease Mother     Dementia Mother     Heart disease Father     Prostate cancer Father     Diabetes Brother      Social History      Socioeconomic History    Marital status:    Tobacco Use    Smoking status: Current Every Day Smoker     Packs/day: 0.25    Smokeless tobacco: Never Used   Substance and Sexual Activity    Alcohol use: Not Currently     Comment: Stopped alcohol use 15 years ago.    Drug use: Yes     Types: Marijuana, Cocaine     Comment: Last used in her 30s.     Medication List with Changes/Refills   Current Medications    ASPIRIN-ACETAMINOPHEN-CAFFEINE 500-325-65 MG PWPK    Take 1 Dose by mouth as needed.    HYDROCODONE-ACETAMINOPHEN (NORCO)  MG PER TABLET    Take 1 tablet by mouth every 4 (four) hours as needed for Pain.    METHADONE (DOLOPHINE) 5 MG/5 ML SOLUTION    Take 110 mg by mouth once daily.    METOPROLOL TARTRATE (LOPRESSOR) 25 MG TABLET    Take 25 mg by mouth once daily.    NAPROXEN (NAPROSYN) 375 MG TABLET    Take 1 tablet (375 mg total) by mouth 2 (two) times daily with meals.     Review of patient's allergies indicates:   Allergen Reactions    Cortisone      Physical Exam:   There is no height or weight on file to calculate BMI.  There were no vitals filed for this visit.   GENERAL: Well appearing, appropriate for stated age, no acute distress.  CARDIOVASCULAR: Pulses regular by peripheral palpation.  PULMONARY: Respirations are even and non-labored.  NEURO: Awake, alert, and oriented x 3.  PSYCH: Mood & affect are appropriate.  HEENT: Head is normocephalic and atraumatic.    Right ankle:  Moderate swelling, mild ecchymosis, tenderness distal fibula, syndesmosis.  Intact EHL, FHL, gastrocsoleus, and tibialis anterior. Sensation intact to light touch in superficial peroneal, deep peroneal, tibial, sural, and saphenous nerve distributions. Foot warm and well perfused with capillary refill of less than 2 seconds and palpable pedal pulses.     Imaging:    X-Ray Ankle Complete Right  Narrative: EXAMINATION:  XR ANKLE COMPLETE 3 VIEW RIGHT    CLINICAL HISTORY:  Pain in right ankle and joints of right  foot    TECHNIQUE:  AP, lateral, and oblique images of the right ankle were performed.    COMPARISON:  None    FINDINGS:  Soft tissue swelling.  Fracture through the lateral malleolus.  Widening of the medial ankle joint.  Impression: Displaced lateral malleolar fracture with widening of the ankle mortise joint.  Extensive soft tissue swelling.    Electronically signed by: Shabbir Lim MD  Date:    03/13/2022  Time:    15:48      Relevant imaging results reviewed and interpreted by me, discussed with the patient and / or family today. I agree with findings stated above.       Patient Instructions     Assessment:  Elham Leiva is a 57 y.o. female unemployed disabled with a chief complaint of Injury of the Right Ankle     Right ankle distal fibula fracture, possible syndesmosis injury    Encounter Diagnosis   Name Primary?    Closed fracture of right ankle with routine healing, subsequent encounter Yes      Plan:   Ju ankle swelling has not reduced enough to proceed with surgery   New plaster short-leg splint today applied by sports medicine assistance under my guidance   Elevate to reduce swelling as much as possible   We will discuss pain management with her methadone clinic   Consents signed today   Labs (PTT and PT/INR) on way out today    Follow-up: next Monday with Dianne for swelling check or sooner if there are any problems between now and then.    Thank you for choosing [facility] and Dr. Gary Carlin for your orthopedic & sports medicine care. It is our goal to provide you with exceptional care that will help keep you healthy, active, and get you back in the game.    If you felt that you received exemplary care today, please consider leaving us feedback on Healthgrades at [email or web address].     Please do not hesitate to reach out to us via email, phone, or MyChart with any questions, concerns, or feedback.    If you are  experiencing pain/discomfort ,or have questions after 5pm and would like to be connected to the [facility] on-call team, please call this number and specify which Sports Medicine provider is treating you: [phone]        Provider Note/Medical Decision Making:          I had a long discussion with the patient about treatment options, including operative and nonoperative treatments. We discussed pros and cons of each including risks pertinent to surgery including pain, infection, bleeding, damage to adjacent structures like nerves and blood vessels, failure to heal, need for future surgeries, stiffness, instability, loss of limb, anesthesia risks like stroke, blood clot, loss of life. We discussed the possibility of need for later hardware removal in the case that hardware was used. We discussed common and uncommon risks, and discussed patient specific factors that may increase the risks present with surgery. All questions were answered. The patient expressed understanding of the pros and cons of surgery and wanted to proceed with surgical treatment.    We have discussed the role of smoking in bone healing.  We talked about methadone being a risk factor for worsen postoperative pain.  We talked about her increased risk of nonunion, wound healing, bone healing and infection rate.  We talked about risk of increase surgical complication including loss of limb loss of life consistent with previous history of cerebral aneurysm.  We discussed clearance with anesthesia.   We discussed COVID19 with the patient, they are aware of our current policies and procedures, were given the option of delaying surgery, and they elect to proceed. All questions were answered.     I discussed worrisome and red flag signs and symptoms with the patient. The patient expressed understanding and agreed to alert me immediately or to go to the emergency room if they experience any of these.     Treatment plan was developed with input from the patient/family, and they expressed understanding and agreement with the plan. All questions were answered today.    Disclaimer: This note was prepared using a voice recognition system and is likely to have sound alike errors within the text.     I, Johan Andres, acted as a scribe for Gary Carlin MD for the duration of this office visit.

## 2022-03-23 NOTE — PROGRESS NOTES
Patient ID: Elham Leiva  YOB: 1965  MRN: [id number]    Chief Complaint: Injury of the Right Ankle    Referred By: ED (on-call injury)    History of Present Illness: Elham Leiva is a 57 y.o. female   unemployed disabled with a chief complaint of Injury of the Right Ankle    Elham Leiva is a 57-year-old female in clinic today for evaluation of right ankle pain.  Patient states that she was sitting on the edge of her bed tying her shoe when she fell over.  This injury occurred on 3/12/2022.  Patient was evaluated in the emergency department where radiographs were obtained and a displaced lateral malleolus fracture with widening of the ankle mortise joint was identified.  Patient was placed in a splint evaluated in our orthopedic trauma clinic on 3/16/22.  She was placed in a plaster splint and has been NWB.  She was instructed to return to office today for a swelling check and to discuss surgery for her ankle.    Patient has a history of cerebral aneurysm.  She is also being treated for opiate addiction with methadone.  She had some trouble getting her prescription for this but has been able to work it out.    Past Medical History:   Past Medical History:   Diagnosis Date    Arthritis     Brain aneurysm     Digestive disorder     with h/o bleeding ulcers.    Encounter for blood transfusion     Hypertension     Lung abscess     Opioid abuse     PUD (peptic ulcer disease)     Tobacco abuse      Past Surgical History:   Procedure Laterality Date    ABDOMINAL SURGERY      APPENDECTOMY      BRAIN SURGERY      s/p Coiling >10 years ago.    Colonosocpy      DILATION AND CURETTAGE OF UTERUS      PARTIAL GASTRECTOMY      UPPER GASTROINTESTINAL ENDOSCOPY       Family History   Problem Relation Age of Onset    Uterine cancer Mother     Heart disease Mother     Dementia Mother     Heart disease Father     Prostate cancer Father     Diabetes Brother      Social History      Socioeconomic History    Marital status:    Tobacco Use    Smoking status: Current Every Day Smoker     Packs/day: 0.25    Smokeless tobacco: Never Used   Substance and Sexual Activity    Alcohol use: Not Currently     Comment: Stopped alcohol use 15 years ago.    Drug use: Yes     Types: Marijuana, Cocaine     Comment: Last used in her 30s.     Medication List with Changes/Refills   Current Medications    ASPIRIN-ACETAMINOPHEN-CAFFEINE 500-325-65 MG PWPK    Take 1 Dose by mouth as needed.    HYDROCODONE-ACETAMINOPHEN (NORCO)  MG PER TABLET    Take 1 tablet by mouth every 4 (four) hours as needed for Pain.    METHADONE (DOLOPHINE) 5 MG/5 ML SOLUTION    Take 110 mg by mouth once daily.    METOPROLOL TARTRATE (LOPRESSOR) 25 MG TABLET    Take 25 mg by mouth once daily.    NAPROXEN (NAPROSYN) 375 MG TABLET    Take 1 tablet (375 mg total) by mouth 2 (two) times daily with meals.     Review of patient's allergies indicates:   Allergen Reactions    Cortisone      Physical Exam:   There is no height or weight on file to calculate BMI.  There were no vitals filed for this visit.   GENERAL: Well appearing, appropriate for stated age, no acute distress.  CARDIOVASCULAR: Pulses regular by peripheral palpation.  PULMONARY: Respirations are even and non-labored.  NEURO: Awake, alert, and oriented x 3.  PSYCH: Mood & affect are appropriate.  HEENT: Head is normocephalic and atraumatic.    Right ankle:  Moderate swelling, mild ecchymosis, tenderness distal fibula, syndesmosis.  Intact EHL, FHL, gastrocsoleus, and tibialis anterior. Sensation intact to light touch in superficial peroneal, deep peroneal, tibial, sural, and saphenous nerve distributions. Foot warm and well perfused with capillary refill of less than 2 seconds and palpable pedal pulses.     Imaging:    X-Ray Ankle Complete Right  Narrative: EXAMINATION:  XR ANKLE COMPLETE 3 VIEW RIGHT    CLINICAL HISTORY:  Pain in right ankle and joints of right  foot    TECHNIQUE:  AP, lateral, and oblique images of the right ankle were performed.    COMPARISON:  None    FINDINGS:  Soft tissue swelling.  Fracture through the lateral malleolus.  Widening of the medial ankle joint.  Impression: Displaced lateral malleolar fracture with widening of the ankle mortise joint.  Extensive soft tissue swelling.    Electronically signed by: Shabbir Lim MD  Date:    03/13/2022  Time:    15:48      Relevant imaging results reviewed and interpreted by me, discussed with the patient and / or family today. I agree with findings stated above.       Patient Instructions     Assessment:  Elham Leiva is a 57 y.o. female unemployed disabled with a chief complaint of Injury of the Right Ankle     Right ankle distal fibula fracture, possible syndesmosis injury    Encounter Diagnosis   Name Primary?    Closed fracture of right ankle with routine healing, subsequent encounter Yes      Plan:   uJ ankle swelling has not reduced enough to proceed with surgery   New plaster short-leg splint today applied by sports medicine assistance under my guidance   Elevate to reduce swelling as much as possible   We will discuss pain management with her methadone clinic   Consents signed today   Labs (PTT and PT/INR) on way out today    Follow-up: next Monday with Dianne for swelling check or sooner if there are any problems between now and then.    Thank you for choosing [facility] and Dr. Gary Carlin for your orthopedic & sports medicine care. It is our goal to provide you with exceptional care that will help keep you healthy, active, and get you back in the game.    If you felt that you received exemplary care today, please consider leaving us feedback on Healthgrades at [email or web address].     Please do not hesitate to reach out to us via email, phone, or MyChart with any questions, concerns, or feedback.    If you are  experiencing pain/discomfort ,or have questions after 5pm and would like to be connected to the [facility] on-call team, please call this number and specify which Sports Medicine provider is treating you: [phone]        Provider Note/Medical Decision Making:          I had a long discussion with the patient about treatment options, including operative and nonoperative treatments. We discussed pros and cons of each including risks pertinent to surgery including pain, infection, bleeding, damage to adjacent structures like nerves and blood vessels, failure to heal, need for future surgeries, stiffness, instability, loss of limb, anesthesia risks like stroke, blood clot, loss of life. We discussed the possibility of need for later hardware removal in the case that hardware was used. We discussed common and uncommon risks, and discussed patient specific factors that may increase the risks present with surgery. All questions were answered. The patient expressed understanding of the pros and cons of surgery and wanted to proceed with surgical treatment.    We have discussed the role of smoking in bone healing.  We talked about methadone being a risk factor for worsen postoperative pain.  We talked about her increased risk of nonunion, wound healing, bone healing and infection rate.  We talked about risk of increase surgical complication including loss of limb loss of life consistent with previous history of cerebral aneurysm.  We discussed clearance with anesthesia.   We discussed COVID19 with the patient, they are aware of our current policies and procedures, were given the option of delaying surgery, and they elect to proceed. All questions were answered.     I discussed worrisome and red flag signs and symptoms with the patient. The patient expressed understanding and agreed to alert me immediately or to go to the emergency room if they experience any of these.     Treatment plan was developed with input from the patient/family, and they expressed understanding and agreement with the plan. All questions were answered today.    Disclaimer: This note was prepared using a voice recognition system and is likely to have sound alike errors within the text.     I, Johan Andres, acted as a scribe for Gary Carlin MD for the duration of this office visit.

## 2022-03-23 NOTE — PATIENT INSTRUCTIONS
Assessment:  Elham Leiva is a 57 y.o. female unemployed disabled with a chief complaint of Injury of the Right Ankle    Right ankle distal fibula fracture, possible syndesmosis injury    Encounter Diagnosis   Name Primary?    Closed fracture of right ankle with routine healing, subsequent encounter Yes      Plan:  Elham's ankle swelling has not reduced enough to proceed with surgery  New plaster short-leg splint today applied by sports medicine assistance under my guidance  Elevate to reduce swelling as much as possible  We will discuss pain management with her methadone clinic  Consents signed today  Labs (PTT and PT/INR) on way out today    Follow-up: next Monday with Dianne for swelling check or sooner if there are any problems between now and then.    Thank you for choosing [facility] and Dr. Gary Carlin for your orthopedic & sports medicine care. It is our goal to provide you with exceptional care that will help keep you healthy, active, and get you back in the game.    If you felt that you received exemplary care today, please consider leaving us feedback on Healthgrades at [email or web address].     Please do not hesitate to reach out to us via email, phone, or MyChart with any questions, concerns, or feedback.    If you are experiencing pain/discomfort ,or have questions after 5pm and would like to be connected to the [facility] on-call team, please call this number and specify which Sports Medicine provider is treating you: [phone]

## 2022-03-25 ENCOUNTER — TELEPHONE (OUTPATIENT)
Dept: ORTHOPEDICS | Facility: CLINIC | Age: 57
End: 2022-03-25
Payer: MEDICAID

## 2022-03-25 NOTE — TELEPHONE ENCOUNTER
Called regarding request for pain management plan for pt's upcoming surgery. I was transferred twice. There seemed to be confusion on their end regarding the request. Spoke with a nurse who stated that she would let the MD know about the request. I stated that it was urgent, and gave call-back # and fax #.

## 2022-03-28 ENCOUNTER — TELEPHONE (OUTPATIENT)
Dept: PREADMISSION TESTING | Facility: HOSPITAL | Age: 57
End: 2022-03-28
Payer: MEDICAID

## 2022-03-28 ENCOUNTER — TELEPHONE (OUTPATIENT)
Dept: ORTHOPEDICS | Facility: CLINIC | Age: 57
End: 2022-03-28
Payer: MEDICAID

## 2022-03-28 NOTE — TELEPHONE ENCOUNTER
1st attempt to contact pt in regards to her missed appt for sx on tomorrow. Patient did not answer. Attempted to call both numbers and they did not answer and unable to lvm-DD

## 2022-03-28 NOTE — TELEPHONE ENCOUNTER
Called and spoke with the patient about the following:    Your Surgery arrival time is at 9 am on 3/29/22 at [facility] location.    The address is [address] [place]  [address].     Only one adult (over 18) is to accompany you to surgery, unless it is a Pediatric patient, then 2 adults are encouraged to accompany them to the surgery center.    Your ride MUST STAY the entire time until you are discharged.      Please come in the main lobby (located on the 1st floor).     Be prepared to show your photo ID and insurance card.     Masks should be worn by ALL persons entering the building.     Nothing to eat or drink after after midnight, unless you were instructed to take specific medications discussed with the Pre-admit Nurse.     Please call [phone] with any questions or concerns.     Thanks.

## 2022-03-29 ENCOUNTER — HOSPITAL ENCOUNTER (OUTPATIENT)
Facility: HOSPITAL | Age: 57
Discharge: HOME | End: 2022-03-29
Attending: ORTHOPAEDIC SURGERY | Admitting: ORTHOPAEDIC SURGERY
Payer: MEDICAID

## 2022-03-29 ENCOUNTER — HOSPITAL ENCOUNTER (OUTPATIENT)
Dept: RADIOLOGY | Facility: HOSPITAL | Age: 57
Discharge: HOME | End: 2022-03-29
Attending: PHYSICIAN ASSISTANT
Payer: MEDICAID

## 2022-03-29 VITALS
HEIGHT: 69 IN | WEIGHT: 188.94 LBS | RESPIRATION RATE: 18 BRPM | TEMPERATURE: 99 F | HEART RATE: 71 BPM | BODY MASS INDEX: 27.98 KG/M2 | DIASTOLIC BLOOD PRESSURE: 85 MMHG | OXYGEN SATURATION: 98 % | SYSTOLIC BLOOD PRESSURE: 143 MMHG

## 2022-03-29 DIAGNOSIS — I67.1 BRAIN ANEURYSM: ICD-10-CM

## 2022-03-29 DIAGNOSIS — I67.1 BRAIN ANEURYSM: Primary | ICD-10-CM

## 2022-03-29 LAB — SARS-COV-2 RNA NPH QL NAA+NON-PROBE: NOT DETECTED

## 2022-03-29 PROCEDURE — 70450 CT HEAD/BRAIN W/O DYE: CPT | Mod: TC

## 2022-03-29 PROCEDURE — 29515 APPLICATION SHORT LEG SPLINT: CPT | Mod: RT

## 2022-03-29 PROCEDURE — 29515 PR APPLY LOWER LEG SPLINT: ICD-10-PCS | Mod: RT,,, | Performed by: ORTHOPAEDIC SURGERY

## 2022-03-29 PROCEDURE — 29515 APPLICATION SHORT LEG SPLINT: CPT | Mod: RT,,, | Performed by: ORTHOPAEDIC SURGERY

## 2022-03-29 RX ORDER — DIPHENHYDRAMINE HYDROCHLORIDE 50 MG/ML
25 INJECTION INTRAMUSCULAR; INTRAVENOUS EVERY 6 HOURS PRN
Status: CANCELLED | OUTPATIENT
Start: 2022-03-29

## 2022-03-29 RX ORDER — HYDROCODONE BITARTRATE AND ACETAMINOPHEN 5; 325 MG/1; MG/1
1 TABLET ORAL
Status: CANCELLED | OUTPATIENT
Start: 2022-03-29

## 2022-03-29 RX ORDER — ONDANSETRON 2 MG/ML
4 INJECTION INTRAMUSCULAR; INTRAVENOUS ONCE AS NEEDED
Status: CANCELLED | OUTPATIENT
Start: 2022-03-29 | End: 2033-08-25

## 2022-03-29 RX ORDER — MEPERIDINE HYDROCHLORIDE 25 MG/ML
12.5 INJECTION INTRAMUSCULAR; INTRAVENOUS; SUBCUTANEOUS ONCE
Status: CANCELLED | OUTPATIENT
Start: 2022-03-29 | End: 2022-03-29

## 2022-03-29 RX ORDER — ALBUTEROL SULFATE 0.83 MG/ML
2.5 SOLUTION RESPIRATORY (INHALATION) EVERY 4 HOURS PRN
Status: DISCONTINUED | OUTPATIENT
Start: 2022-03-29 | End: 2022-03-29 | Stop reason: HOSPADM

## 2022-03-29 RX ORDER — FENTANYL CITRATE 50 UG/ML
25 INJECTION, SOLUTION INTRAMUSCULAR; INTRAVENOUS EVERY 5 MIN PRN
Status: CANCELLED | OUTPATIENT
Start: 2022-03-29

## 2022-03-29 NOTE — PATIENT INSTRUCTIONS
Ankle Surgery   Post-Operative Instructions  Gary Carlin MD  [address] [place] [address]  [email or web address]  Telephone: [phone]  Fax: [phone]      1.  After you get home, apply ice to your ankle but keep the bandages dry. You may apply ice for 15-20 minutes every 1-2 hours for the next few days. Ice helps to reduce pain and swelling. If you have a cooling device, use that per  instructions.    2. Elevate your leg on 2-3 pillows or rolled up towels placed under the heel so that the heel is elevated higher than your knee. This will help reduce swelling.    3. It is important to get up and move around after your surgery. It's good for your lungs after anesthesia, and good for your circulation to help prevent blood clots from developing.  However, too much walking will cause the ankle to swell and hurt.    4. After 72 hours, you can remove the ACE wrap and bandages. You should then place new gauze/bandages and ACE wrap each day for 2 weeks.    5. You may shower, but the incisions, ACE bandages, and Brace must not get wet until 72 hours after surgery and only if there is no drainage at all from the incisions. After two weeks it is ok to soak your leg underwater.    6. Weight-Bearing Status: You are to be (non-weight bearing) on your operative leg.     7. Take the pain medicine as needed. You may take up to 2 tablets every 4-6 hours if needed. As the pain subsides try to increase the time between doses.     8. Your first post-operative check-up with Dr. Carlin should be 12-14 days from the day of surgery.  Please call the appointment desk to schedule if you do not have an appointment already.    9. It is normal to have some discomfort and swelling, as well as a small  amount of blood-tinged drainage, following surgery. If this becomes severe, or if you develop a fever greater than or equal to 101 degrees, calf pain, or shortness of breath or chest pain, please call immediately. If you have questions or problems, call the office at [phone].    NORMAL SENSATIONS AND FINDINGS AFTER SURGERY  Shin pain  Ankle swelling and warmth up to 2 weeks  Small amounts of bloody drainage  Numbness around the incision area  Soreness and swelling in the foot and ankle.  Bruising to the lower leg, ankle, and foot.   Lower leg swelling, including the ankle - if this occurs elevate the leg above the heart and apply ice to the swollen areas.  Numbness to the foot - will resolve within a few days  Low grade temperature less than 101.5 - if this occurs drink plenty of fluids and cough and deep breathe (take 10 breaths, on the last hold for a second then forcefully cough a few times). A low-grade temp is normal for a week after surgery  Small amount of redness to the area where the sutures insert in the skin  Low back discomfort due to the epidural / spinal anesthesia apply a heating pad as needed      NOTIFY OUR OFFICE IMMEDIATELY AT [phone] IF ANY OF THE FOLLOWING SIGNS OR SYMPTOMS OCCUR:    Chest pain or shortness of breath  Change is noted to your incision (i.e. increased redness or drainage)  Sharp pains in the back of your hip, thigh, or calf  Temperature greater than 101.5 degrees  Fever, chills, nausea, vomiting or diarrhea  Stitches loosen or fall out and incisions open up  Thick, foul-smelling drainage (yellow or greenish)  Increased pain which is not relieved by medications or other measures mentioned above

## 2022-03-29 NOTE — INTERVAL H&P NOTE
The patient has been examined and the H&P has been reviewed:    I concur with the findings and changes have been noted since the H&P was written:      Surgery risks, benefits and alternative options discussed and understood by patient/family.    Upon further discussion with anesthesia, patient notified them of an untreated brain aneurysm, not discussed during preoperative workup. They acknowledged the need for timely fracture fixation but recommended neurosurgical workup prior to surgery. Working to arrange timely neurosurgery evaluation, will delay until NS recommendations.      There are no hospital problems to display for this patient.

## 2022-03-29 NOTE — PROGRESS NOTES
After patient had further discussion with anesthesia, she notified them that she does have one aneurysm that is untreated. After further discussion with anesthesia, they recommended delaying surgery for further workup. Currently working to get patient in with Neurosurgery.   SEIZURE

## 2022-03-29 NOTE — PROGRESS NOTES
"ADDENDUM: patient endorsed history of additional untreated aneurysm prior to surgery. After discussion with anesthesia they recommended workup by neurosurgery prior to surgery. Will work to expedite considering this is a fracture and the timing of injury.      Ortho Preoperative Update    Diagnosis: right displaced distal fibular fracture, unstable    Plan: ORIF right distal fibula, possible syndesmotic fixation, any indicated procedures    WBC   Date Value Ref Range Status   03/22/2022 7.36 3.90 - 12.70 K/uL Final     RBC   Date Value Ref Range Status   03/22/2022 4.58 4.00 - 5.40 M/uL Final     Hemoglobin   Date Value Ref Range Status   03/22/2022 12.8 12.0 - 16.0 g/dL Final     Hematocrit   Date Value Ref Range Status   03/22/2022 39.9 37.0 - 48.5 % Final     Platelets   Date Value Ref Range Status   03/22/2022 278 150 - 450 K/uL Final       @ANLASTLAB(PROTIME)@  PT @ANLASTLAB(PTT)@  PTT   @ANLASTLAB(INR)@  INR       Vitals:    03/29/22 0856   BP: (!) 143/85   Pulse: 71   Resp: 18   Temp: 98.6 °F (37 °C)   TempSrc: Temporal   SpO2: 98%   Weight: 85.7 kg (188 lb 15 oz)   Height: 5' 9" (1.753 m)       Recent Results (from the past 24 hour(s))   COVID-19 Rapid Screening    Collection Time: 03/29/22  8:45 AM   Result Value Ref Range    SARS-COV-2 Not Detected Not Detected       I had a long discussion with the patient about treatment options. We discussed operative and non-operative options. We discussed the risks of surgery at length, including but not limited to pain, infection, bleeding, damage to adjacent structures such as nerves and blood vessels, failure to heal, stiffness, laxity, need for more surgery, stroke, blood clot, loss of life, loss of limb, need for removal of any implants used, anesthesia risks, breathing problems, and heart problems. She has numerous increased risks to this surgery - including previous history of aneurysm that was coiled. Preop cleared by anesthesia preadmit and anesthesiologist. " Risk of nonunion, non-healing, failure of fixation, need for more surgeries in addition to medial risks of surgery. She also missed her preop swelling check yesterday and removed her splint on her own. She has a risk of decreased ability for pain control due to chronic narcotic and methadone use. Risk of overdose. Discussed this with the patient, she expressed understanding. They expressed understanding of the risks an opted to proceed with surgical management.

## 2022-03-29 NOTE — PLAN OF CARE
New RLE wrap applied per sports medicine assistant at bedside.  Patient ok for discharge per Kathy BROWN. NP spoke with patient at bedside and provided details for f/u. RN removed PIV and accompanied patient to car. D/c to family [facility].

## 2022-03-29 NOTE — PROCEDURES
Short leg splint (plaster, well padded) applied with foot in neutral dorsiflexion. Patient tolerated procedure well.

## 2022-03-30 ENCOUNTER — TELEPHONE (OUTPATIENT)
Dept: ORTHOPEDICS | Facility: CLINIC | Age: 57
End: 2022-03-30
Payer: MEDICAID

## 2022-03-30 NOTE — PROGRESS NOTES
Subjective:      Patient ID: Elham Leiva is a 57 y.o. female.    HPI   The patient is here today for evaluation regarding brain aneurysm  She recently completed a CT of the Head for evaluation of brain aneurysm.    The patient is hoping to get rescheduled for outpatient surgery to address a R ankle fracture with orthopedics pending today's visit.   Patient states she was tying her shoe lace , loss her balance and fell on 3/12/22 resulting in the fracture.  She does have a h/o a cerebral aneurysm. Underwent coiling several years ago. Due to this, patient cannot have MRIs (not compatible).    Currently she does not complain of any pain other than the R ankle.  Denies HA, dizziness, nausea, vomiting, LOC.  She does have a h/o falls and seizures. Last one was 12 yrs ago per patient.   Patient does have a chronic h/o headaches. States she's had them since high school.  Usually has them on R side- occipital but they do not occur daily.   Whenever she has headaches she takes 3 Goodies.   Currently smiles 5-6 cigs/day, down from 2.5 ppd.      Previous records (retrieved from Care Everywhere):  Renu Solo MD - 07/21/2016 11:29 AM CDT  CHIEF COMPLAINT (CC) /REASON FOR REFERRAL:  The patient is a 51 year-old right-handed lady who is here for evaluation of cerebral aneurysm.  HISTORY OF PRESENT ILLNESS (HPI):  The patient was diagnosed with cerebral aneurysms in 2009.  She said that she underwent coiling in the LT side for 8 mm aneurysm.   She was also found to have RT side 2 mm aneurysm which was being monitored.   The last time she underwent imaging was 1 year ago ([id number]).  She has a very strong family history of cerebral aneurysms.        Objective:     Body mass index is 27.76 kg/m².  Vitals:    03/31/22 1403   BP: (!) 146/83   Pulse: 81   Resp: 16         Lab Results   Component Value Date    WBC 7.36 03/22/2022    HCT 39.9 03/22/2022     NEUROLOGICAL EXAMINATION:     MENTAL STATUS   Oriented to person, place,  and time.   Attention: normal. Concentration: normal.   Speech: speech is normal   Level of consciousness: alert  Knowledge: consistent with education.     CRANIAL NERVES   Cranial nerves II through XII intact.     MOTOR EXAM     Strength   Right deltoid: 5/5  Left deltoid: 5/5  Right biceps: 5/5  Left biceps: 5/5  Right triceps: 5/5  Left triceps: 5/5  Right wrist flexion: 5/5  Left wrist flexion: 5/5  Right wrist extension: 5/5  Left wrist extension: 5/5  Right interossei: 5/5  Left interossei: 5/5  Right quadriceps: 5/5  Left quadriceps: 5/5  Right hamstrin/5  Left hamstrin/5        INDEPENDENT INTERPRETATION OF TEST:  CT Head-  FINDINGS:  Metallic density in the left para sellar region with associated metallic artifacts.  Otherwise no evidence for hemorrhage mass effect or midline shift.  Mild volume loss and suggestion of chronic microvascular ischemic changes.  No definite acute process seen.   Impression:   No acute abnormality.    Prior Imaging (Care Everywhere)-  Procedure Note    Alban Gonzalez MD - 2018   EXAMINATION: ED CT HEAD WO CONTRAST   HISTORY:  Fall, head injury with HA   FINDINGS:   Axial imaging of the brain was performed without the use of intravenous contrast material. Automated exposure control was utilized for dose reduction. Comparison is made to study dated 10/22/2014.   Embolization coil materials are again noted in the left parasellar region. There is no evidence to suggest an acute hemorrhage. The ventricular system is stable in size shape and position. No new suspicious intracranial mass or abnormal extra-axial fluid is seen. Calvarium is intact. There is mucosal thickening and partial opacification evident within anterior ethmoid sinus air cells on the right. Mild mucosal thickening also evident in the right maxillary antrum.   IMPRESSION:   1. No evidence of an acute intracranial hemorrhage.   2. Previous endovascular coiling of left parasellar aneurysm.   3. Stable  appearance of brain compared to study dated 10/22/2014.       Relevant imaging results reviewed and interpreted by me, discussed with the patient and / or family today.  Assessment:     1. Brain aneurysm    2. Family history of brain aneurysm      Plan:     Brain aneurysm    Family history of brain aneurysm      Based on recent imaging, the coils appear stable. There are no new abnormalities.   Patient instructed to inform us if she experiences worsening HA, vision problems. In this event, will obtain CT Angio and if treatment needed will refer to Dr. Alcon Curry.  Patient reminded about BP and lipid control and to decrease and eventually stop smoking.   From a neurosurgery standpoint, she may resume to have surgery with our Orthopedics department.   Please call with any changes.     Beth Ritter PA-C  [facility]

## 2022-03-30 NOTE — TELEPHONE ENCOUNTER
Spoke with pain [facility] to Dr. Sandra regarding treatment for pain management after surgery due to the patients methadone regimen then would need to be on a short acting medication at low dose. Norco 5mg disp 30 no refills.

## 2022-03-31 ENCOUNTER — OFFICE VISIT (OUTPATIENT)
Dept: NEUROSURGERY | Facility: CLINIC | Age: 57
End: 2022-03-31
Payer: MEDICAID

## 2022-03-31 VITALS
DIASTOLIC BLOOD PRESSURE: 83 MMHG | HEART RATE: 81 BPM | SYSTOLIC BLOOD PRESSURE: 146 MMHG | HEIGHT: 69 IN | WEIGHT: 188 LBS | BODY MASS INDEX: 27.85 KG/M2 | RESPIRATION RATE: 16 BRPM

## 2022-03-31 DIAGNOSIS — Z82.49 FAMILY HISTORY OF BRAIN ANEURYSM: ICD-10-CM

## 2022-03-31 DIAGNOSIS — I67.1 BRAIN ANEURYSM: Primary | ICD-10-CM

## 2022-03-31 PROCEDURE — 99203 PR OFFICE/OUTPT VISIT, NEW, LEVL III, 30-44 MIN: ICD-10-PCS | Mod: S$PBB,,, | Performed by: PHYSICIAN ASSISTANT

## 2022-03-31 PROCEDURE — 3079F DIAST BP 80-89 MM HG: CPT | Mod: CPTII,,, | Performed by: PHYSICIAN ASSISTANT

## 2022-03-31 PROCEDURE — 3079F PR MOST RECENT DIASTOLIC BLOOD PRESSURE 80-89 MM HG: ICD-10-PCS | Mod: CPTII,,, | Performed by: PHYSICIAN ASSISTANT

## 2022-03-31 PROCEDURE — 99213 OFFICE O/P EST LOW 20 MIN: CPT | Mod: PBBFAC,PO | Performed by: PHYSICIAN ASSISTANT

## 2022-03-31 PROCEDURE — 3008F BODY MASS INDEX DOCD: CPT | Mod: CPTII,,, | Performed by: PHYSICIAN ASSISTANT

## 2022-03-31 PROCEDURE — 99999 PR PBB SHADOW E&M-EST. PATIENT-LVL III: CPT | Mod: PBBFAC,,, | Performed by: PHYSICIAN ASSISTANT

## 2022-03-31 PROCEDURE — 3008F PR BODY MASS INDEX (BMI) DOCUMENTED: ICD-10-PCS | Mod: CPTII,,, | Performed by: PHYSICIAN ASSISTANT

## 2022-03-31 PROCEDURE — 1159F MED LIST DOCD IN RCRD: CPT | Mod: CPTII,,, | Performed by: PHYSICIAN ASSISTANT

## 2022-03-31 PROCEDURE — 99203 OFFICE O/P NEW LOW 30 MIN: CPT | Mod: S$PBB,,, | Performed by: PHYSICIAN ASSISTANT

## 2022-03-31 PROCEDURE — 3077F PR MOST RECENT SYSTOLIC BLOOD PRESSURE >= 140 MM HG: ICD-10-PCS | Mod: CPTII,,, | Performed by: PHYSICIAN ASSISTANT

## 2022-03-31 PROCEDURE — 99999 PR PBB SHADOW E&M-EST. PATIENT-LVL III: ICD-10-PCS | Mod: PBBFAC,,, | Performed by: PHYSICIAN ASSISTANT

## 2022-03-31 PROCEDURE — 1159F PR MEDICATION LIST DOCUMENTED IN MEDICAL RECORD: ICD-10-PCS | Mod: CPTII,,, | Performed by: PHYSICIAN ASSISTANT

## 2022-03-31 PROCEDURE — 3077F SYST BP >= 140 MM HG: CPT | Mod: CPTII,,, | Performed by: PHYSICIAN ASSISTANT

## 2022-04-01 DIAGNOSIS — S82.891D CLOSED FRACTURE OF RIGHT ANKLE WITH ROUTINE HEALING, SUBSEQUENT ENCOUNTER: Primary | ICD-10-CM

## 2022-04-04 ENCOUNTER — DOCUMENTATION ONLY (OUTPATIENT)
Dept: PREADMISSION TESTING | Facility: HOSPITAL | Age: 57
End: 2022-04-04
Payer: MEDICAID

## 2022-04-04 NOTE — PROGRESS NOTES
Patient was evaluated by FARNAZ Durbin with Neurosurgery and per d/w with Beth today, she is cleared for surgery from their standpoint with no further w/u indicated at this time.  Dr. Carlin notified with plans to proceed with ORIF tomorrow.

## 2023-11-16 ENCOUNTER — HOSPITAL ENCOUNTER (EMERGENCY)
Facility: HOSPITAL | Age: 58
Discharge: HOME | End: 2023-11-16
Attending: EMERGENCY MEDICINE
Payer: MEDICAID

## 2023-11-16 VITALS
HEIGHT: 67 IN | WEIGHT: 196 LBS | SYSTOLIC BLOOD PRESSURE: 153 MMHG | TEMPERATURE: 99 F | HEART RATE: 88 BPM | DIASTOLIC BLOOD PRESSURE: 79 MMHG | BODY MASS INDEX: 30.76 KG/M2 | RESPIRATION RATE: 18 BRPM | OXYGEN SATURATION: 100 %

## 2023-11-16 DIAGNOSIS — S89.92XA LEFT KNEE INJURY, INITIAL ENCOUNTER: ICD-10-CM

## 2023-11-16 PROCEDURE — 29505 APPLICATION LONG LEG SPLINT: CPT | Mod: LT

## 2023-11-16 PROCEDURE — 99283 EMERGENCY DEPT VISIT LOW MDM: CPT | Mod: 25

## 2023-11-17 NOTE — ED PROVIDER NOTES
History      Chief Complaint   Patient presents with    Knee Pain     Pt reports she was walking to the bathroom two nights ago and felt her left knee pop. Pt now reporting pain and swelling in the knee       Review of patient's allergies indicates:   Allergen Reactions    Cortisone     Demerol [meperidine] Other (See Comments)     Headache        HPI   HPI    11/17/2023, 5:21 PM   History obtained from the patient      History of Present Illness: Elham Leiva is a 58 y.o. female patient who presents to the Emergency Department for left knee pain since walking to the bathroom 2 nights ago and feeling a pop in her knee.  She denies fall or any other type of injury.  Denies fever.          PCP: [facility]       Past Medical History:  Past Medical History:   Diagnosis Date    Arthritis     Brain aneurysm     Digestive disorder     with h/o bleeding ulcers.    Encounter for blood transfusion     Hypertension     Lung abscess     Opioid abuse     PUD (peptic ulcer disease)     Tobacco abuse          Past Surgical History:  Past Surgical History:   Procedure Laterality Date    ABDOMINAL SURGERY      APPENDECTOMY      BRAIN SURGERY      s/p Coiling >10 years ago.    Colonosocpy      DILATION AND CURETTAGE OF UTERUS      PARTIAL GASTRECTOMY      UPPER GASTROINTESTINAL ENDOSCOPY             Family History:  Family History   Problem Relation Age of Onset    Uterine cancer Mother     Heart disease Mother     Dementia Mother     Heart disease Father     Prostate cancer Father     Diabetes Brother            Social History:  Social History     Tobacco Use    Smoking status: Every Day     Current packs/day: 0.25     Types: Cigarettes    Smokeless tobacco: Never   Substance and Sexual Activity    Alcohol use: Not Currently     Comment: Stopped alcohol use 15 years ago.    Drug use: Not Currently    Sexual activity: Yes       ROS     Review of Systems   Constitutional:  Negative for fever.  "  Musculoskeletal:  Positive for arthralgias.       Physical Exam      Initial Vitals [11/16/23 1524]   BP Pulse Resp Temp SpO2   (!) 146/84 98 18 97.8 °F (36.6 °C) 97 %      MAP       --         Physical Exam  Vital signs and nursing notes reviewed.  Constitutional: Patient is in NAD. Awake and alert. Well-developed and well-nourished.  Head: Atraumatic. Normocephalic.  Eyes:  EOM intact. Conjunctivae nl. No scleral icterus.  ENT: Mucous membranes are moist.   Neck: Supple.  No meningismus  Cardiovascular: Regular rate and rhythm. No murmurs, rubs, or gallops.   Pulmonary/Chest: No respiratory distress. Clear to auscultation bilaterally. No wheezing, rales, or rhonchi.  Musculoskeletal: Moves all extremities.  Left knee with mild edema, full range of motion.  No heat or erythema.  2+ DP pulse  Skin: Warm and dry.  Neurological: Awake and alert. No acute focal neurological deficits are appreciated.  Psychiatric: Normal affect. Good eye contact. Appropriate in content.      ED Course          Procedures  ED Vital Signs:  Vitals:    11/16/23 1524 11/16/23 1641   BP: (!) 146/84 (!) 153/79   Pulse: 98 88   Resp: 18 18   Temp: 97.8 °F (36.6 °C) 98.7 °F (37.1 °C)   TempSrc: Oral Oral   SpO2: 97% 100%   Weight: 88.9 kg (196 lb)    Height: 5' 7" (1.702 m)                  Imaging Results:  Imaging Results              X-Ray Knee Complete 4 or More Views Left (Final result)  Result time 11/16/23 15:53:55      Final result by Roseanne Peguero MD (11/16/23 15:53:55)                   Impression:      Technique suboptimal with extraneous artifact present    Suprapatellar joint effusion    No acute fracture or joint malalignment seen.  Tri compartment osteoarthritis has progressed moderate..      Electronically signed by: Roseanne Peguero  Date:    11/16/2023  Time:    15:53               Narrative:    EXAMINATION:  XR KNEE COMP 4 OR MORE VIEWS LEFT    CLINICAL HISTORY:  Unspecified injury of left lower leg, initial " encounter    COMPARISON:  July 2018                                         The Emergency Provider reviewed the vital signs and test results, which are outlined above.    ED Discussion             Medication(s) given in the ER:  Medications - No data to display         Follow-up Information       [place] [place] In 2 days.    Contact information:  [address]  [place] [address] [phone]                                    Medication List        ASK your doctor about these medications      aspirin-acetaminophen-caffeine 500-325-65 mg Pwpk     HYDROcodone-acetaminophen  mg per tablet  Commonly known as: NORCO  Take 1 tablet by mouth every 4 (four) hours as needed for Pain.     methadone 5 mg/5 mL solution  Commonly known as: DOLOPHINE     metoprolol tartrate 25 MG tablet  Commonly known as: LOPRESSOR     naproxen 375 MG tablet  Commonly known as: NAPROSYN  Take 1 tablet (375 mg total) by mouth 2 (two) times daily with meals.                  Medical Decision Making        All findings were reviewed with the patient/family in detail.   All remaining questions and concerns were addressed at that time.  Patient/family has been counseled regarding the need for follow-up as well as the indication to return to the emergency room should new or worrisome developments occur.        MDM                 Clinical Impression:        ICD-10-CM ICD-9-CM   1. Left knee injury, initial encounter  S89.92XA 959.7               Maria Luisa Davies, RAVEN  11/17/23 4931

## (undated) DEVICE — MANIFOLD 4 PORT

## (undated) DEVICE — ELECTRODE REM PLYHSV RETURN 9

## (undated) DEVICE — Device

## (undated) DEVICE — BANDAGE ESMARK ELASTIC ST 6X9

## (undated) DEVICE — COVER CAMERA OPERATING ROOM

## (undated) DEVICE — SEE MEDLINE ITEM 146298

## (undated) DEVICE — DRAPE PLASTIC U 60X72

## (undated) DEVICE — DRESSING XEROFORM FOIL PK 1X8

## (undated) DEVICE — GLOVE SURG BIOGEL LATEX SZ 7.5

## (undated) DEVICE — GAUZE SPONGE 4X4 12PLY

## (undated) DEVICE — ADHESIVE MASTISOL VIAL 48/BX

## (undated) DEVICE — DRAPE C-ARMOR EQUIPMENT COVER

## (undated) DEVICE — BANDAGE ACE DOUBLE STER 6IN

## (undated) DEVICE — ALCOHOL 70% ISOP RUBBING 4OZ

## (undated) DEVICE — UNDERGLOVES BIOGEL PI SIZE 8.5

## (undated) DEVICE — BNDG COFLEX FOAM LF2 ST 6X5YD

## (undated) DEVICE — DRAPE MOBILE C-ARM

## (undated) DEVICE — PAD CAST SPECIALIST STRL 6

## (undated) DEVICE — GLOVE SURGEONS ULTRA TOUCH 5.5

## (undated) DEVICE — BLADE SURG #15 CARBON STEEL

## (undated) DEVICE — DRAPE STERI U-SHAPED 47X51IN

## (undated) DEVICE — SEE MEDLINE ITEM 157027

## (undated) DEVICE — SUT 4-0 ETHILON 18 PS-2

## (undated) DEVICE — GLOVE PROTEXIS LTX  8.5

## (undated) DEVICE — SUT VICRYL PLUS 0 CT1 18IN

## (undated) DEVICE — APPLICATOR CHLORAPREP ORN 26ML

## (undated) DEVICE — COVER LIGHT HANDLE 80/CA

## (undated) DEVICE — SUPPORT ULNA NERVE PROTECTOR

## (undated) DEVICE — SPLINT PLASTER FS 5IN X 30IN

## (undated) DEVICE — BANDAGE GYPSONA HP PLSTR 4X5YD

## (undated) DEVICE — PAD CAST SPECIALIST STRL 4

## (undated) DEVICE — SEE MEDLINE ITEM 157131

## (undated) DEVICE — BNDG COFLEX FOAM LF2 ST 4X5YD

## (undated) DEVICE — UNDERGLOVES BIOGEL PI SIZE 8

## (undated) DEVICE — UNDERGLOVES BIOGEL PI SZ 6 LF

## (undated) DEVICE — TOWEL OR DISP STRL BLUE 4/PK

## (undated) DEVICE — POSITIONER HEAD DONUT 9IN FOAM

## (undated) DEVICE — INSTRUMENT FRAZIER 10FR W/VENT

## (undated) DEVICE — GOWN SMARTGOWN LVL4 X-LONG XL

## (undated) DEVICE — SUT VICRYL PLUS 2-0 CT1 18